# Patient Record
Sex: MALE | Race: AMERICAN INDIAN OR ALASKA NATIVE | NOT HISPANIC OR LATINO | Employment: FULL TIME | URBAN - METROPOLITAN AREA
[De-identification: names, ages, dates, MRNs, and addresses within clinical notes are randomized per-mention and may not be internally consistent; named-entity substitution may affect disease eponyms.]

---

## 2018-12-25 ENCOUNTER — HOSPITAL ENCOUNTER (EMERGENCY)
Facility: MEDICAL CENTER | Age: 33
End: 2018-12-25
Attending: EMERGENCY MEDICINE
Payer: COMMERCIAL

## 2018-12-25 VITALS
HEIGHT: 70 IN | WEIGHT: 156.53 LBS | DIASTOLIC BLOOD PRESSURE: 78 MMHG | TEMPERATURE: 99.1 F | BODY MASS INDEX: 22.41 KG/M2 | OXYGEN SATURATION: 98 % | RESPIRATION RATE: 18 BRPM | SYSTOLIC BLOOD PRESSURE: 133 MMHG | HEART RATE: 79 BPM

## 2018-12-25 DIAGNOSIS — R51.9 FACIAL PAIN: ICD-10-CM

## 2018-12-25 DIAGNOSIS — K04.7 DENTAL INFECTION: ICD-10-CM

## 2018-12-25 PROCEDURE — A9270 NON-COVERED ITEM OR SERVICE: HCPCS | Performed by: EMERGENCY MEDICINE

## 2018-12-25 PROCEDURE — 99284 EMERGENCY DEPT VISIT MOD MDM: CPT

## 2018-12-25 PROCEDURE — 700102 HCHG RX REV CODE 250 W/ 637 OVERRIDE(OP): Performed by: EMERGENCY MEDICINE

## 2018-12-25 RX ORDER — PENICILLIN V POTASSIUM 500 MG/1
500 TABLET ORAL ONCE
Status: COMPLETED | OUTPATIENT
Start: 2018-12-25 | End: 2018-12-25

## 2018-12-25 RX ORDER — PENICILLIN V POTASSIUM 500 MG/1
500 TABLET ORAL 4 TIMES DAILY
Qty: 28 TAB | Refills: 0 | Status: SHIPPED | OUTPATIENT
Start: 2018-12-25 | End: 2019-01-01

## 2018-12-25 RX ADMIN — PENICILLIN V POTASIUM 500 MG: 500 TABLET OROPHARYNGEAL at 22:22

## 2018-12-26 NOTE — ED TRIAGE NOTES
"Chief Complaint   Patient presents with   • Dental Pain     states molar broke 1 month ago, hasn't had time to have it fixed     /78   Pulse 68   Temp 37.4 °C (99.4 °F) (Temporal)   Resp 18   Ht 1.778 m (5' 10\")   Wt 71 kg (156 lb 8.4 oz)   SpO2 99%   BMI 22.46 kg/m²     "

## 2018-12-26 NOTE — DISCHARGE INSTRUCTIONS
Take your antibiotics as directed. You received your first dose here in the emergency room.     Follow up with a dentist as previously planned on Friday.    Return immediately for difficulty swallowing, fevers, worsening facial swelling, or any other concerns.

## 2018-12-26 NOTE — ED PROVIDER NOTES
"ED Provider Note    CHIEF COMPLAINT  Chief Complaint   Patient presents with   • Dental Pain     states molar broke 1 month ago, hasn't had time to have it fixed       HPI  Adam Ahmet Quintanilla is a 33 y.o. male with no major past medical history who presents with L upper molar dental pain ongoing x 1 month but acutely worse in past 2 days, now associated with some increased feeling of swelling in mouth. Has dentist but due to work has not been able to go see them. No fevers, no difficulty swallowing, no neck pain.    REVIEW OF SYSTEMS  See HPI for further details. All other systems are negative.     PAST MEDICAL HISTORY       SOCIAL HISTORY  Social History     Social History Main Topics   • Smoking status: Never Smoker   • Smokeless tobacco: Not on file   • Alcohol use Yes      Comment: occ   • Drug use: No   • Sexual activity: Not on file       SURGICAL HISTORY  patient denies any surgical history    CURRENT MEDICATIONS  Home Medications    **Home medications have not yet been reviewed for this encounter**         ALLERGIES  No Known Allergies    PHYSICAL EXAM  VITAL SIGNS: /78   Pulse 79   Temp 37.3 °C (99.1 °F) (Temporal)   Resp 18   Ht 1.778 m (5' 10\")   Wt 71 kg (156 lb 8.4 oz)   SpO2 98%   BMI 22.46 kg/m²   Pulse ox interpretation: I interpret this pulse ox as normal  Constitutional: Alert, in no apparent distress   HENT: Normocephalic, Atraumatic, Bilateral external ears normal. Nose normal. Moist mucous membranes. L upper molar with extensive dental caries, tender with percussion, some surrounding edema and erythema of gingiva. No facial swelling or erythema, induration, or heat of face. Floor of mouth soft. Posterior OP with no edema.  Eyes: Pupils are equal and reactive. Conjunctiva normal, non-icteric.   Heart: Regular rate and rythm, no murmurs. Radial pulses   Lungs:  Clear to auscultation bilaterally. No increased work of breathing.   Skin: Warm, Dry, No erythema, No rash.  Neurologic: " Alert, Fluent speech. No facial droop. Grossly non-focal.   Psychiatric: Affect normal, Judgment normal, Mood normal, Appears appropriate and not intoxicated.     DIAGNOSTIC STUDIES / PROCEDURES        COURSE & MEDICAL DECISION MAKING  Nursing notes and vital signs reviewed. Pertinent Labs & Imaging studies reviewed. (See chart for details)    32 yo M with no major past med hx here with increased dental pain, L upper molar, over past 2 days in setting of known dental caries. PE is reassuring for deep space infection, chico's angina, extension of infection into neck or vessels. Discussed supportive care with patient and importance of f/u with dentist, which he plans to do in 3 days. Given acute change in sxs and the increased gingival edema, will treat with course of PCN as well.     Patient is stable for discharge at this time, anticipatory guidance provided, PCN rx with first dose in ED, close follow-up is encouraged, and strict ED return instructions have been detailed. Patient is agreeable to the disposition and plan  and discharged home in ambulatory state and in good condition.      FINAL IMPRESSION  (K04.7) Dental infection  (R51) Facial pain      Electronically signed by: Mei Hobbs, 12/25/2018 10:11 PM    This dictation was created using voice recognition software. The accuracy of the dictation is limited to the abilities of the software. I expect there may be some errors of grammar and possibly content. The nursing notes were reviewed and certain aspects of this information were incorporated into this note.

## 2022-08-29 ENCOUNTER — HOSPITAL ENCOUNTER (EMERGENCY)
Facility: MEDICAL CENTER | Age: 37
End: 2022-08-29
Attending: EMERGENCY MEDICINE
Payer: MEDICAID

## 2022-08-29 ENCOUNTER — APPOINTMENT (OUTPATIENT)
Dept: RADIOLOGY | Facility: MEDICAL CENTER | Age: 37
End: 2022-08-29
Attending: EMERGENCY MEDICINE
Payer: MEDICAID

## 2022-08-29 VITALS
TEMPERATURE: 98.8 F | SYSTOLIC BLOOD PRESSURE: 120 MMHG | WEIGHT: 139.33 LBS | OXYGEN SATURATION: 95 % | DIASTOLIC BLOOD PRESSURE: 76 MMHG | HEART RATE: 74 BPM | HEIGHT: 69 IN | RESPIRATION RATE: 14 BRPM | BODY MASS INDEX: 20.64 KG/M2

## 2022-08-29 DIAGNOSIS — R79.89 ABNORMAL BILIRUBIN TEST: ICD-10-CM

## 2022-08-29 DIAGNOSIS — R10.9 ACUTE ABDOMINAL PAIN: ICD-10-CM

## 2022-08-29 LAB
ALBUMIN SERPL BCP-MCNC: 4.9 G/DL (ref 3.2–4.9)
ALBUMIN/GLOB SERPL: 1.8 G/DL
ALP SERPL-CCNC: 64 U/L (ref 30–99)
ALT SERPL-CCNC: 23 U/L (ref 2–50)
ANION GAP SERPL CALC-SCNC: 12 MMOL/L (ref 7–16)
APPEARANCE UR: CLEAR
AST SERPL-CCNC: 19 U/L (ref 12–45)
BASOPHILS # BLD AUTO: 0.5 % (ref 0–1.8)
BASOPHILS # BLD: 0.03 K/UL (ref 0–0.12)
BILIRUB SERPL-MCNC: 1.7 MG/DL (ref 0.1–1.5)
BILIRUB UR QL STRIP.AUTO: NEGATIVE
BUN SERPL-MCNC: 9 MG/DL (ref 8–22)
CALCIUM SERPL-MCNC: 9.7 MG/DL (ref 8.4–10.2)
CHLORIDE SERPL-SCNC: 106 MMOL/L (ref 96–112)
CO2 SERPL-SCNC: 24 MMOL/L (ref 20–33)
COLOR UR: YELLOW
CREAT SERPL-MCNC: 0.81 MG/DL (ref 0.5–1.4)
EOSINOPHIL # BLD AUTO: 0.03 K/UL (ref 0–0.51)
EOSINOPHIL NFR BLD: 0.5 % (ref 0–6.9)
ERYTHROCYTE [DISTWIDTH] IN BLOOD BY AUTOMATED COUNT: 39.3 FL (ref 35.9–50)
GFR SERPLBLD CREATININE-BSD FMLA CKD-EPI: 116 ML/MIN/1.73 M 2
GLOBULIN SER CALC-MCNC: 2.8 G/DL (ref 1.9–3.5)
GLUCOSE SERPL-MCNC: 96 MG/DL (ref 65–99)
GLUCOSE UR STRIP.AUTO-MCNC: NEGATIVE MG/DL
HCT VFR BLD AUTO: 46 % (ref 42–52)
HGB BLD-MCNC: 16 G/DL (ref 14–18)
IMM GRANULOCYTES # BLD AUTO: 0.01 K/UL (ref 0–0.11)
IMM GRANULOCYTES NFR BLD AUTO: 0.2 % (ref 0–0.9)
KETONES UR STRIP.AUTO-MCNC: NEGATIVE MG/DL
LEUKOCYTE ESTERASE UR QL STRIP.AUTO: NEGATIVE
LIPASE SERPL-CCNC: 44 U/L (ref 7–58)
LYMPHOCYTES # BLD AUTO: 1.65 K/UL (ref 1–4.8)
LYMPHOCYTES NFR BLD: 26.6 % (ref 22–41)
MCH RBC QN AUTO: 31.7 PG (ref 27–33)
MCHC RBC AUTO-ENTMCNC: 34.8 G/DL (ref 33.7–35.3)
MCV RBC AUTO: 91.1 FL (ref 81.4–97.8)
MICRO URNS: NORMAL
MONOCYTES # BLD AUTO: 0.37 K/UL (ref 0–0.85)
MONOCYTES NFR BLD AUTO: 6 % (ref 0–13.4)
NEUTROPHILS # BLD AUTO: 4.11 K/UL (ref 1.82–7.42)
NEUTROPHILS NFR BLD: 66.2 % (ref 44–72)
NITRITE UR QL STRIP.AUTO: NEGATIVE
NRBC # BLD AUTO: 0 K/UL
NRBC BLD-RTO: 0 /100 WBC
PH UR STRIP.AUTO: 6 [PH] (ref 5–8)
PLATELET # BLD AUTO: 202 K/UL (ref 164–446)
PMV BLD AUTO: 9.3 FL (ref 9–12.9)
POTASSIUM SERPL-SCNC: 3.8 MMOL/L (ref 3.6–5.5)
PROT SERPL-MCNC: 7.7 G/DL (ref 6–8.2)
PROT UR QL STRIP: NEGATIVE MG/DL
RBC # BLD AUTO: 5.05 M/UL (ref 4.7–6.1)
RBC UR QL AUTO: NEGATIVE
SODIUM SERPL-SCNC: 142 MMOL/L (ref 135–145)
SP GR UR STRIP.AUTO: 1.01
WBC # BLD AUTO: 6.2 K/UL (ref 4.8–10.8)

## 2022-08-29 PROCEDURE — A9270 NON-COVERED ITEM OR SERVICE: HCPCS | Performed by: EMERGENCY MEDICINE

## 2022-08-29 PROCEDURE — 85025 COMPLETE CBC W/AUTO DIFF WBC: CPT

## 2022-08-29 PROCEDURE — 36415 COLL VENOUS BLD VENIPUNCTURE: CPT

## 2022-08-29 PROCEDURE — 87491 CHLMYD TRACH DNA AMP PROBE: CPT

## 2022-08-29 PROCEDURE — 81003 URINALYSIS AUTO W/O SCOPE: CPT

## 2022-08-29 PROCEDURE — 99285 EMERGENCY DEPT VISIT HI MDM: CPT

## 2022-08-29 PROCEDURE — 80053 COMPREHEN METABOLIC PANEL: CPT

## 2022-08-29 PROCEDURE — 96374 THER/PROPH/DIAG INJ IV PUSH: CPT

## 2022-08-29 PROCEDURE — 74177 CT ABD & PELVIS W/CONTRAST: CPT

## 2022-08-29 PROCEDURE — 700117 HCHG RX CONTRAST REV CODE 255: Performed by: EMERGENCY MEDICINE

## 2022-08-29 PROCEDURE — 83690 ASSAY OF LIPASE: CPT

## 2022-08-29 PROCEDURE — 87591 N.GONORRHOEAE DNA AMP PROB: CPT

## 2022-08-29 PROCEDURE — 700111 HCHG RX REV CODE 636 W/ 250 OVERRIDE (IP): Performed by: EMERGENCY MEDICINE

## 2022-08-29 PROCEDURE — 700102 HCHG RX REV CODE 250 W/ 637 OVERRIDE(OP): Performed by: EMERGENCY MEDICINE

## 2022-08-29 RX ORDER — KETOROLAC TROMETHAMINE 30 MG/ML
15 INJECTION, SOLUTION INTRAMUSCULAR; INTRAVENOUS ONCE
Status: COMPLETED | OUTPATIENT
Start: 2022-08-29 | End: 2022-08-29

## 2022-08-29 RX ORDER — OMEPRAZOLE 20 MG/1
20 CAPSULE, DELAYED RELEASE ORAL DAILY
Qty: 30 CAPSULE | Refills: 0 | Status: SHIPPED | OUTPATIENT
Start: 2022-08-29 | End: 2022-09-28

## 2022-08-29 RX ADMIN — KETOROLAC TROMETHAMINE 15 MG: 30 INJECTION, SOLUTION INTRAMUSCULAR; INTRAVENOUS at 17:51

## 2022-08-29 RX ADMIN — LIDOCAINE HYDROCHLORIDE 30 ML: 20 SOLUTION OROPHARYNGEAL at 17:51

## 2022-08-29 RX ADMIN — IOHEXOL 100 ML: 350 INJECTION, SOLUTION INTRAVENOUS at 17:47

## 2022-08-29 NOTE — ED PROVIDER NOTES
ED Provider Note    Scribed for Sachin Hsieh M.D. by Jorje Anne. 8/29/2022  4:55 PM    Primary care provider: Pcp Pt States None  Means of arrival: Walk-in  History obtained from: Patient  History limited by: None     CHIEF COMPLAINT  Chief Complaint   Patient presents with    Flank Pain     Sharp and achy Lt flank pain radiating to anterior abd  Onset Fri or Sat  Associated with some dysuria and foul smelling urine  No N/V/D/Fever  Tried laxatives with no relief       HPI  Stoney Ahmet Quintanilla is a 37 y.o. male who presents to the Emergency Department for evaluation of intermittent left sided flank pain, onset 2 days ago. He states that the pain is mostly constant, but goes away occasionally. He describes it as a sharp stabbing pain that radiates from his back to his anterior abdomen. He notes that the pain sometimes wake him up from sleep. He has associated symptoms of dysuria and foul smelling urine. He denies any hematuria, groin swelling, groin pain, discharge, chest pain, or lower extremity pain. He also denies any recent trauma or drinking alcohol. He has not taken ibuprofen for the pain. There are no known alleviating or exacerbating factors. He denies any major medical history and he is not followed by a primary care provider.       REVIEW OF SYSTEMS  Pertinent positives include: left sided flank pain radiating to back and anterior abdomen, dysuria, and foul smelling urine. Pertinent negatives include: hematuria, groin swelling, groin pain, discharge, chest pain, or lower extremity pain. See history of present illness. All other systems are negative.     PAST MEDICAL HISTORY   has a past medical history of Patient denies medical problems.    SURGICAL HISTORY  patient denies any surgical history    SOCIAL HISTORY  Social History     Tobacco Use    Smoking status: Never    Smokeless tobacco: Never   Vaping Use    Vaping Use: Former    Substances: Nicotine   Substance Use Topics    Alcohol use: Not  "Currently    Drug use: Yes     Types: Oral     Comment: THC      Social History     Substance and Sexual Activity   Drug Use Yes    Types: Oral    Comment: THC       FAMILY HISTORY  History reviewed. No pertinent family history.    CURRENT MEDICATIONS  Current Outpatient Medications   Medication Instructions    amphetamine-dextroamphetamine (ADDERALL, 30MG,) 30 MG tablet 30 mg, 2 TIMES DAILY    hydrocodone-acetaminophen (VICODIN) 5-500 MG TABS 1 Tablet, Oral, EVERY 4 HOURS PRN    QUEtiapine Fumarate (SEROQUEL XR PO) Take  by mouth.         ALLERGIES  No Known Allergies    PHYSICAL EXAM  VITAL SIGNS: /82   Pulse 86   Temp 37.1 °C (98.7 °F) (Temporal)   Resp 16   Ht 1.753 m (5' 9\")   Wt 63.2 kg (139 lb 5.3 oz)   SpO2 99%   BMI 20.58 kg/m²     Constitutional: Alert in no apparent distress.  HENT: No signs of trauma, Bilateral external ears normal, Nose normal. Uvula midline.   Eyes: Pupils are equal and reactive, Conjunctiva normal, Non-icteric.   Neck: Normal range of motion, No tenderness, Supple, No stridor.   Lymphatic: No lymphadenopathy noted.   Cardiovascular: Regular rate and rhythm, no murmurs.   Thorax & Lungs: Normal breath sounds, No respiratory distress, No wheezing, No chest tenderness.   Abdomen:  Left upper quadrant tenderness to palpation and left lower quadrant tenderness to palpation. Soft, No peritoneal signs, No masses, No pulsatile masses.   Skin: Warm, Dry, No erythema, No rash.   Back: No bony tenderness, No CVA tenderness.   Extremities: Intact distal pulses, No edema, No tenderness, No cyanosis.  Musculoskeletal: Good range of motion in all major joints. No tenderness to palpation or major deformities noted.   Neurologic: Alert , Normal motor function, Normal sensory function, No focal deficits noted.   Psychiatric: Affect normal, Judgment normal, Mood normal.     DIAGNOSTIC STUDIES / PROCEDURES    LABS  Labs Reviewed   COMP METABOLIC PANEL - Abnormal; Notable for the following " components:       Result Value    Total Bilirubin 1.7 (*)     All other components within normal limits   CBC WITH DIFFERENTIAL   LIPASE   URINALYSIS   ESTIMATED GFR   CHLAMYDIA/GC, PCR (URINE)      All labs reviewed by me.    RADIOLOGY  CT-ABDOMEN-PELVIS WITH   Final Result      No acute abnormality in the abdomen or pelvis.        The radiologist's interpretation of all radiological studies have been reviewed by me.    COURSE & MEDICAL DECISION MAKING  Nursing notes, VS, PMSFHx reviewed in chart.    37 y.o. male p/w chief complaint of left sided flank pain onset two days ago.    4:55 PM Patient seen and examined at bedside. Ordered CT-Abdomen pelvis with, Chlamydia/GC PCR, CBC with differential, CMP, Lipase, and Urinalysis for evaluation. Patient will be treated with toradol injection 15 mg and GI cocktail.     I verified that the patient was wearing a mask and I was wearing appropriate PPE every time I entered the room. The patient's mask was on the patient at all times during my encounter except for a brief view of the oropharynx.     The differential diagnoses include but are not limited to:     #flank pain  Patient otherwise well-appearing with low suspicion for sepsis, dissection or infected obstructed renal colic.   CT w/ unremarkable findings.   No evidence of diverticulitis or nephrolithiasis    Low suspicion for atypical appendicitis, torsion, acute marquita, or intraabdominal infection.   Discussed conservative management, strict return precautions and follow up with urology.     Patient tolerating PO and pain controlled prior to discharge. Strict return precautions for infected stone or PO intolerance discussed.     6:00 PM Patient was reevaluated at bedside.     6:30 PM - I reevaluated the patient at bedside. The patient informs me they feel improved following medication administration. I discussed the patient's diagnostic study results. I discussed plan for discharge and follow up as outlined below. I  informed the patient I would be prescribing omeprazole and instructed him to establish care with a PCP. The patient is stable for discharge at this time and will return for any new or worsening symptoms. Patient verbalizes understanding and support with my plan for discharge.       The patient will return for new or worsening symptoms and is stable at the time of discharge.    The patient is referred to a primary physician for blood pressure management, diabetic screening, and for all other preventative health concerns.    DISPOSITION:  Patient will be discharged home in stable condition.    FOLLOW UP:  [facility], Emergency Dept  [address]  [facility] [id number] [phone]    If symptoms worsen    [facility]  [address]  [facility] [address]  [phone]  In 3 days      OUTPATIENT MEDICATIONS:  New Prescriptions    OMEPRAZOLE (PRILOSEC) 20 MG DELAYED-RELEASE CAPSULE    Take 1 Capsule by mouth every day for 30 days.         FINAL IMPRESSION  1. Acute abdominal pain    2. Abnormal bilirubin test         Jorje CALI ([place]), am scribing for, and in the presence of, Sachin Hsieh M.D..    Electronically signed by: Jorje Anne ([email or web address]), 8/29/2022    ISachin M.D. personally performed the services described in this documentation, as scribed by Jorje Anne in my presence, and it is both accurate and complete.    The note accurately reflects work and decisions made by me.  Sachin Hsieh M.D.  8/29/2022  11:50 PM

## 2022-08-29 NOTE — ED NOTES
Pt educated to have PIV removed if he decides he wants to leave and no longer wait for care, pt states understanding. Blood and urine sent to lab.

## 2022-08-30 ENCOUNTER — PATIENT OUTREACH (OUTPATIENT)
Dept: SCHEDULING | Facility: IMAGING CENTER | Age: 37
End: 2022-08-30
Payer: COMMERCIAL

## 2022-08-30 LAB
C TRACH DNA SPEC QL NAA+PROBE: NEGATIVE
N GONORRHOEA DNA SPEC QL NAA+PROBE: NEGATIVE
SPECIMEN SOURCE: NORMAL

## 2022-08-30 NOTE — DISCHARGE INSTRUCTIONS
Please discuss the following findings with your regular doctor:  CT-ABDOMEN-PELVIS WITH   Final Result      No acute abnormality in the abdomen or pelvis.        Labs Reviewed   COMP METABOLIC PANEL - Abnormal; Notable for the following components:       Result Value    Total Bilirubin 1.7 (*)     All other components within normal limits   CBC WITH DIFFERENTIAL   LIPASE   URINALYSIS   ESTIMATED GFR   CHLAMYDIA/GC, PCR (URINE)

## 2022-08-30 NOTE — ED NOTES
Patient discharged home in stable condition  AVS provided with recommended follow up and home care instructions and education information  Prescription for Prilosec electronically provided at this time  Patient verbalized understanding  Ambulatory at time of discharge

## 2022-08-30 NOTE — ED NOTES
Rounding completed   Patient resting comfortably on gurney  Given medications for discomfort  Lights dimmed for comfort; call light within reach  Denies any other needs at this time

## 2023-03-17 ENCOUNTER — HOSPITAL ENCOUNTER (OUTPATIENT)
Dept: RADIOLOGY | Facility: MEDICAL CENTER | Age: 38
End: 2023-03-17
Attending: PHYSICIAN ASSISTANT
Payer: MEDICAID

## 2023-03-17 DIAGNOSIS — N45.3 EPIDIDYMO-ORCHITIS WITHOUT ABSCESS: ICD-10-CM

## 2023-03-17 PROCEDURE — 76870 US EXAM SCROTUM: CPT
